# Patient Record
Sex: MALE
[De-identification: names, ages, dates, MRNs, and addresses within clinical notes are randomized per-mention and may not be internally consistent; named-entity substitution may affect disease eponyms.]

---

## 2019-07-11 PROBLEM — Z00.00 ENCOUNTER FOR PREVENTIVE HEALTH EXAMINATION: Status: ACTIVE | Noted: 2019-07-11

## 2019-08-02 ENCOUNTER — APPOINTMENT (OUTPATIENT)
Dept: OTOLARYNGOLOGY | Facility: CLINIC | Age: 69
End: 2019-08-02
Payer: MEDICARE

## 2019-08-02 VITALS
DIASTOLIC BLOOD PRESSURE: 80 MMHG | BODY MASS INDEX: 22.82 KG/M2 | WEIGHT: 142 LBS | HEIGHT: 66 IN | SYSTOLIC BLOOD PRESSURE: 134 MMHG | HEART RATE: 72 BPM

## 2019-08-02 PROCEDURE — 31254 NSL/SINS NDSC W/PRTL ETHMDCT: CPT

## 2019-08-02 PROCEDURE — 61782 SCAN PROC CRANIAL EXTRA: CPT

## 2019-08-02 PROCEDURE — 31267 ENDOSCOPY MAXILLARY SINUS: CPT

## 2019-08-02 NOTE — REVIEW OF SYSTEMS
[Seasonal Allergies] : seasonal allergies [Nasal Congestion] : nasal congestion [Throat Clearing] : throat clearing [Negative] : Heme/Lymph

## 2019-08-02 NOTE — PROCEDURE
The patient was treated for what was presumed to be a cellulitis following an IV. This was during her recent surgery. The area of the IV insertion was in the forearm were as the area of redness was more about the elbow but in any case it's almost entirely gone now. She does have what feels to be a thrombosed are sclerosed vein proximal to where the IV had gone.    In any case it looks fine      She does tell me that her 30-year-old daughter living in [place] was just diagnosed with colon cancer and family members were all advised to get colonoscopies. The patient doesn't think she's ever had one but has had negative stool Hemoccults at this point. I have placed a consult which she associates      15 minutes 95% consultation 10% exam   [FreeTextEntry3] : Preoperative diagnosis: Pansinusitis and pansinus polyposis\par Postoperative diagnosis: Same\par Procedure: Left endoscopic ethmoidectomy and maxillary sinusotomy with removal of nasal polyps\par Surgeon: Haim\par Anesthesia: Topical plus local\par \par Findings the patient has the history of the pansinusitis and nasal polyposis-recurrence. He is particularly bothered by the left-sided nasal obstruction and comes in for unilateral but more limited procedure. All risks and benefits were discussed with the patient as well as alternatives who understands and agrees.\par \par The nasal cavity was anesthetized topically with Afrin and tetracaine and then 2 cc of cocaine 4% were used intranasally on pledgets.\par Next, 3 cc of 1% lidocaine with epinephrine one 100,000 were used for injection.\par \par The [employer] headset was placed and the tertiary points marked.\par \par Reevaluation showed that there was this large obstructing polypoid disease completely filling the left nasal cavity coming from the left ethmoids.\par \par using image guidance\par

## 2019-08-02 NOTE — HISTORY OF PRESENT ILLNESS
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 He has the history of recurrent polypoid sinusitis. He has the history of the particularly bothersome and obstructing left sided polypoid disease and comes in for endoscopic surgery in the office. All risks and benefits and alternatives were discussed with the patient who understood and agreed.\par The option of a more complete endoscopic sinus surgery was discussed but she prefers a more limited procedure.\par The bothersome disease completely obstruct the airway anteriorly and comes from the left ethmoid labyrinth. This would be done with image guidance.

## 2019-08-06 ENCOUNTER — APPOINTMENT (OUTPATIENT)
Dept: OTOLARYNGOLOGY | Facility: CLINIC | Age: 69
End: 2019-08-06
Payer: MEDICARE

## 2019-08-06 VITALS
WEIGHT: 142 LBS | DIASTOLIC BLOOD PRESSURE: 79 MMHG | HEIGHT: 66 IN | HEART RATE: 81 BPM | SYSTOLIC BLOOD PRESSURE: 134 MMHG | BODY MASS INDEX: 22.82 KG/M2

## 2019-08-06 PROCEDURE — 31237 NSL/SINS NDSC SURG BX POLYPC: CPT | Mod: 50,58

## 2019-08-06 NOTE — ASSESSMENT
[FreeTextEntry1] : It was my impression that he was doing quite well 3 days post endoscopic surgery for left-sided maxillary and ethmoid polyposis.\par I placed him on budesonide rinses and would like to see him back for repeat debridement next week

## 2019-08-06 NOTE — PHYSICAL EXAM
[FreeTextEntry1] : The patient returns for postoperative sinus debridement CPT 10300-05.\par \par Procedure note:\par \par The nasal cavity was anesthetized topically and locally.  Both rigid 0 and 30° endoscopes were used for debridement.  There was no evidence of hematoma.  Using straight and curved suctions and straight and 30° up-biting forceps, the nasal cavity and sinuses were debrided anteriorly.  The patient appeared to be having an excellent result.  Propel was removed from the left. There were no complications. \par

## 2019-08-06 NOTE — HISTORY OF PRESENT ILLNESS
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 He is  3 days and status post left-sided endoscopic sinus surgery for polypoid sinusitis. His surgery was uneventful and she comes in for first postoperative debridement.

## 2019-08-13 ENCOUNTER — APPOINTMENT (OUTPATIENT)
Dept: OTOLARYNGOLOGY | Facility: CLINIC | Age: 69
End: 2019-08-13
Payer: MEDICARE

## 2019-08-13 PROCEDURE — 31231 NASAL ENDOSCOPY DX: CPT

## 2019-08-13 NOTE — ASSESSMENT
[FreeTextEntry1] : It was my impression that he was doing quite well 2 weeks postoperatively.\par There was no evidence of persistent disease in the sinuses were healing well.\par I recommended using the budesonide rinses and would like to see him back in one more time in about 3 weeks

## 2019-08-13 NOTE — HISTORY OF PRESENT ILLNESS
[de-identified] : MATT ONTIVEROS Was seen in followup for second postoperative debridements. He has been doing quite well and no significant improvement in his airway. He had not yet started rinses

## 2019-08-13 NOTE — PHYSICAL EXAM
[FreeTextEntry1] : The patient is status post endoscopic sinus surgery and returns for middle meatal debridement.\par \par Procedure note:  02251-93\par \par The nasal cavity was anesthetized topically and locally.  Using a rigid 0° and a rigid 30° endoscope, using both straight and curved suction iand a straight and the 30° up-biting forceps, the left middle meatus was cleaned.\par He was having an excellent result without polyps or masses.

## 2019-09-03 ENCOUNTER — APPOINTMENT (OUTPATIENT)
Dept: OTOLARYNGOLOGY | Facility: CLINIC | Age: 69
End: 2019-09-03
Payer: MEDICARE

## 2019-09-03 VITALS
HEIGHT: 66 IN | WEIGHT: 142 LBS | SYSTOLIC BLOOD PRESSURE: 148 MMHG | DIASTOLIC BLOOD PRESSURE: 95 MMHG | HEART RATE: 80 BPM | BODY MASS INDEX: 22.82 KG/M2

## 2019-09-03 PROCEDURE — 31231 NASAL ENDOSCOPY DX: CPT

## 2019-09-03 PROCEDURE — 99213 OFFICE O/P EST LOW 20 MIN: CPT | Mod: 25

## 2019-09-03 NOTE — PHYSICAL EXAM
[FreeTextEntry1] : General:\par The patient was alert and oriented and in no distress.\par He had a slightly breathy dysphonia\par \par Oral cavity:\par The oral mucosa was normal.\par The oral and base of tongue were clear and without mass.\par The gingival and buccal mucosa were moist and without lesions.\par The palate moved well.\par There was no cleft to the palate.\par There appeared to be good salivary flow.  \par There was no pus, erythema or mass in the oral cavity.\par \par Neck: \par The neck was symmetrical.\par The parotid and submandibular glands were normal without masses.\par The trachea was midline and there was no unusual crepitus.\par The thyroid was smooth and nontender and no masses were palpated.\par There was no significant cervical adenopathy.\par \par Face:\par The patient had no facial asymmetry or mass.\par The skin was unremarkable.\par \par Ears:\par The external ears were normal without deformity.\par The ear canals were clear.\par The tympanic membranes were intact and normal.\par \par TMJ:\par The temporomandibular joints were nontender.\par There was no abnormal crepitus and no significant malocclusion\par \par \par  [de-identified] : Nasal endoscopy: \par CPT 77490\par Procedure Note:\par \par Nasal endoscopy was done with topical anesthesia of Pontocaine and Afrin and a      nasal endoscope.\par Indication: Nasal congestion, rule out sinusitis.\par Procedure: The nasal cavity was anesthetized with topical Afrin and Pontocaine. An  endoscope was used and inserted into the nasal cavity.\par Attention was first paid to the anterior nasal cavity.\par Endocoscopy was performed to inspect the interior of the nasal cavity, the nasal septum,  the middle and superior meati, the inferior, middle and superior turbinates, and the spheno-ethmoidal  recesses, the nasopharynx and eustachian tube orifices bilaterally. \par All findings were normal except:\par Nasal mucosa was boggy.\par The left side which had surgery was completely opened and widely patent with no evidence of disease.\par There was a fair amount of yellow purulent discharge coming out of the right antrum. Switching to a rigid 0° endoscope this was cultured and then suctioned to clear\par \par Flexible fiberoptic laryngoscopy: CPT 92704\par Indications: Dysphagia\par Procedure note:\par  \par Flexible fiberoptic laryngoscopy was performed because of dysphagia and the patient's inability to tolerate adequate mirror examination.\par The nasal cavity was anesthetized with Pontocaine plus Afrin.\par \par \par The nasal cavity was anesthetized with Pontocaine and Afrin.\par The flexible endoscope was placed into the patient's nasal cavity.\par The nasopharynx was without masses.\par The oropharynx, vallecula and base of tongue had no masses.\par The epiglottis, aryepiglottic folds and false vocal cords were normal.\par The pyriform sinuses were without mucosal lesions or pooling of secretions.  \par The laryngeal ventricles were without lesions.\par The visualized subglottis was without mass.\par The lateral and posterior pharyngeal walls were clear and symmetrical.\par The vocal folds were clear and mobile; they abducted and adducted normally.\par There was no interarytenoid mass or fullness.\par There was significant posterior laryngeal inflammation consistent with reflux.\par There was a small area of granulation tissue on the right arytenoid\par There was no evidence of thrush

## 2019-09-03 NOTE — ASSESSMENT
[FreeTextEntry1] : It was my impression that he had done well from his sinus surgery but he had an upper respiratory tract infection with a secondary opposite side purulent sinusitis now in spite of a course of oral steroids.\par It started him on cefuroxime on Saturday over the phone.\par  Pending the culture results, which may be non-elucidting I suggested continuing with the course of cefuroxime. I felt he probably could taper the steroids but suggested adding Zantac at bedtime because of the granulations and inflammation and want to see him back in followup in 2-3 weeks to make sure that this has responded,

## 2019-09-03 NOTE — HISTORY OF PRESENT ILLNESS
[de-identified] : MATT ONTIVEROS Was seen in followup on September 3. This was originally in followup for his partial ethmoidectomy from which he has done quite well. However, about 2 weeks ago he developed nasal congestion sore throat cough and hoarseness. He started prednisone but over the weekend developed otalgia and was started on cefuroxime.\par His ear feels better and today his cough is somewhat improved. He still is dysphonic. He has not had problems in terms of his sinuses or the surgery and feels that his breathing is better on the left mouth on the right. He stopped mometasone   rinses.The patient had no other ear nose or throat complaints at this visit.

## 2019-09-09 LAB — BACTERIA FLD CULT: NORMAL

## 2019-09-10 ENCOUNTER — APPOINTMENT (OUTPATIENT)
Dept: OTOLARYNGOLOGY | Facility: CLINIC | Age: 69
End: 2019-09-10

## 2019-09-17 ENCOUNTER — APPOINTMENT (OUTPATIENT)
Dept: OTOLARYNGOLOGY | Facility: CLINIC | Age: 69
End: 2019-09-17
Payer: MEDICARE

## 2019-09-17 DIAGNOSIS — J32.2 CHRONIC ETHMOIDAL SINUSITIS: ICD-10-CM

## 2019-09-17 DIAGNOSIS — J32.0 CHRONIC MAXILLARY SINUSITIS: ICD-10-CM

## 2019-09-17 PROCEDURE — 99213 OFFICE O/P EST LOW 20 MIN: CPT | Mod: 25

## 2019-09-17 PROCEDURE — 31231 NASAL ENDOSCOPY DX: CPT

## 2019-09-17 NOTE — PHYSICAL EXAM
[FreeTextEntry1] : \par The patient was alert and oriented and in no distress.\par Voice was clear.\par \par Face:\par The patient had no facial asymmetry or mass.\par The skin was unremarkable.\par \par Eyes:\par The pupils were equal round and reactive to light and accommodation.\par There was no significant nystagmus or disconjugate gaze noted.\par \par Nose: \par The external nose had no significant deformity.  There was no facial tenderness.  On anterior rhinoscopy, the nasal mucosa was clear.  The anterior septum was midline.  There were no visualized polyps purulence  or masses.\par \par Oral cavity:\par The oral mucosa was normal.\par The oral and base of tongue were clear and without mass.\par The gingival and buccal mucosa were moist and without lesions.\par The palate moved well.\par There was no cleft to the palate.\par There appeared to be good salivary flow.  \par There was no pus, erythema or mass in the oral cavity.\par \par \par Ears:\par The external ears were normal without deformity.\par The ear canals were clear.\par The tympanic membranes were intact and normal.\par \par Neck: \par The neck was symmetrical.\par The parotid and submandibular glands were normal without masses.\par The trachea was midline and there was no unusual crepitus.\par The thyroid was smooth and nontender and no masses were palpated.\par There was no significant cervical adenopathy.\par \par The area of discomfort is the posterior cornu of the thyroid cartilage on the left.\par \par \par Neuro:\par Neurologically, the patient was awake, alert, and oriented to person, place and time. There were no obvious focal neurologic abnormalities.  Cranial nerves II through XII were grossly intact.\par \par \par TMJ:\par The temporomandibular joints were nontender.\par There was no abnormal crepitus and no significant malocclusion\par  [de-identified] : Nasal endoscopy: \par CPT 60308\par Procedure Note:\par \par Nasal endoscopy was done with topical anesthesia of Pontocaine and Afrin and a      nasal endoscope.\par Indication: Nasal congestion, rule out sinusitis.\par Procedure: The nasal cavity was anesthetized with topical Afrin and Pontocaine. An  endoscope was used and inserted into the nasal cavity.\par Attention was first paid to the anterior nasal cavity.\par Endocoscopy was performed to inspect the interior of the nasal cavity, the nasal septum,  the middle and superior meati, the inferior, middle and superior turbinates, and the spheno-ethmoidal  recesses, the nasopharynx and eustachian tube orifices bilaterally. \par All findings were normal except:\par \par On the left side, the mucosa looked excellent the ethmoids and antrostomies had healed well as no evidence of disease.\par He had a small amount of stable polypoid changes on the right with a scant amount of thick allergic appearing mucin without purulence. Nasopharynx is benign\par \par \par Flexible fiberoptic laryngoscopy: CPT 59209\par Indications: Dysphagia\par Procedure note:\par  \par Flexible fiberoptic laryngoscopy was performed because of dysphagia and the patient's inability to tolerate adequate mirror examination.\par The nasal cavity was anesthetized with Pontocaine plus Afrin.\par \par \par The nasal cavity was anesthetized with Pontocaine and Afrin.\par The flexible endoscope was placed into the patient's nasal cavity.\par The nasopharynx was without masses.\par The oropharynx, vallecula and base of tongue had no masses.\par The epiglottis, aryepiglottic folds and false vocal cords were normal.\par The pyriform sinuses were without mucosal lesions or pooling of secretions.  \par The laryngeal ventricles were without lesions.\par The visualized subglottis was without mass.\par The lateral and posterior pharyngeal walls were clear and symmetrical.\par The vocal folds were clear and mobile; they abducted and adducted normally.\par There was no interarytenoid mass or fullness.\par There was significant posterior laryngeal inflammation consistent with reflux.

## 2019-09-17 NOTE — HISTORY OF PRESENT ILLNESS
[de-identified] : MATT ONTIVEROS Was seen in followup on September 17.  He notes that his voice is done better but he had several days of right-sided neck pain. She went to an emergency room where they suggested CT imaging, which she did not have done in the neck discomfort resolved and now he has discomfort on the left. He denies any fever or airway obstruction. His sinuses feel better.The patient had no other ear nose or throat complaints at this visit.

## 2019-10-15 ENCOUNTER — APPOINTMENT (OUTPATIENT)
Dept: OTOLARYNGOLOGY | Facility: CLINIC | Age: 69
End: 2019-10-15
Payer: MEDICARE

## 2019-10-15 DIAGNOSIS — J32.9 CHRONIC SINUSITIS, UNSPECIFIED: ICD-10-CM

## 2019-10-15 DIAGNOSIS — J33.9 CHRONIC SINUSITIS, UNSPECIFIED: ICD-10-CM

## 2019-10-15 PROCEDURE — 99213 OFFICE O/P EST LOW 20 MIN: CPT | Mod: 25

## 2019-10-15 PROCEDURE — 31231 NASAL ENDOSCOPY DX: CPT

## 2019-10-15 NOTE — HISTORY OF PRESENT ILLNESS
[de-identified] : MATT ONTIVEROS Was seen in follow up on October 15. He was doing much better. His throat pain was better and his voice was significantly improved. His nasal airway was better as well. He was using omeprazole in the morning and had switched to Pepcid at night and using fluticasone.\par He comes in for repeat evaluation

## 2019-10-15 NOTE — REASON FOR VISIT
[Subsequent Evaluation] : a subsequent evaluation for [Sinusitis] : sinusitis [Throat Pain] : throat pain

## 2019-10-15 NOTE — PHYSICAL EXAM
[FreeTextEntry1] : General:\par The patient was alert and oriented and in no distress.\par Voice was clear. His voice was improved\par \par Ears:\par The external ears were normal without deformity.\par The ear canals were clear.\par The tympanic membranes were intact and normal.\par \par Oral cavity:\par The oral mucosa was normal.\par The oral and base of tongue were clear and without mass.\par The gingival and buccal mucosa were moist and without lesions.\par The palate moved well.\par There was no cleft to the palate.\par There appeared to be good salivary flow.  \par There was no pus, erythema or mass in the oral cavity.\par \par Neck: \par The neck was symmetrical.\par The parotid and submandibular glands were normal without masses.\par The trachea was midline and there was no unusual crepitus.\par The thyroid was smooth and nontender and no masses were palpated.\par There was no significant cervical adenopathy.\par \par Face:\par The patient had no facial asymmetry or mass.\par The skin was unremarkable.\par \par Neuro:\par Neurologically, the patient was awake, alert, and oriented to person, place and time. There were no obvious focal neurologic abnormalities.  Cranial nerves II through XII were grossly intact.\par \par Nasal endoscopy: \par CPT 34113\par Procedure Note:\par \par Nasal endoscopy was done with topical anesthesia of Pontocaine and Afrin and a      nasal endoscope.\par Indication: Nasal congestion, rule out sinusitis.\par Procedure: The nasal cavity was anesthetized with topical Afrin and Pontocaine. An  endoscope was used and inserted into the nasal cavity.\par Attention was first paid to the anterior nasal cavity.\par Endocoscopy was performed to inspect the interior of the nasal cavity, the nasal septum,  the middle and superior meati, the inferior, middle and superior turbinates, and the spheno-ethmoidal  recesses, the nasopharynx and eustachian tube orifices bilaterally. \par All findings were normal except:\par This showed a nasal mucosa was slightly boggy but much improved. He has a surgically on the left middle meatus was clear and without persistent polypoid disease. He had a right septal deflection and the previous polypoid changes had improved significantly.\par \par Flexible fiberoptic laryngoscopy: [facility] [address]\par Indications: Dysphagia\par Procedure note:\par \par Flexible fiberoptic laryngoscopy was performed because of dysphagia and because of the patient's inability to tolerate adequate mirror examination.\par \par The nasal cavity was anesthetized with Pontocaine and Afrin.\par The flexible endoscope was placed into the patient's nasal cavity.\par The nasopharynx was without masses.\par The oropharynx, vallecula and base of tongue had no masses.\par The epiglottis, aryepiglottic folds and false vocal cords were normal.\par The pyriform sinuses were without mucosal lesions or pooling of secretions.  \par The laryngeal ventricles were without lesions.\par The visualized subglottis was without mass.\par The lateral and posterior pharyngeal walls were clear and symmetrical.\par The vocal folds were clear and mobile; they abducted and adducted normally.\par There was no interarytenoid mass or fullness.\par The endolaryngeal inflammation was much better\par \par \par

## 2019-10-15 NOTE — ASSESSMENT
[FreeTextEntry1] : It was my impression that he had done quite well from the surgery And was doing much better. His throat was better his neck pain was better his voice was better.  at this point he was interested in tapering his medications. I suggested stopping the nighttime Pepcid and just continuing with omeprazole and diet. I explained he probably should continue with the fluticasone but he may want to stop this as well.\par If he continues to do well after a month of the omeprazole alone, he will see if he can stop that as well.\par Otherwise I would like to reevaluate for recurrence of his polyps in 3 months or earlier if needed\par

## 2020-03-24 ENCOUNTER — APPOINTMENT (OUTPATIENT)
Dept: ORTHOPEDIC SURGERY | Facility: CLINIC | Age: 70
End: 2020-03-24

## 2020-10-22 ENCOUNTER — APPOINTMENT (OUTPATIENT)
Dept: OTOLARYNGOLOGY | Facility: CLINIC | Age: 70
End: 2020-10-22
Payer: MEDICARE

## 2020-10-22 VITALS — HEIGHT: 66 IN | WEIGHT: 142 LBS | TEMPERATURE: 98.7 F | BODY MASS INDEX: 22.82 KG/M2

## 2020-10-22 DIAGNOSIS — H61.23 IMPACTED CERUMEN, BILATERAL: ICD-10-CM

## 2020-10-22 PROCEDURE — 69210 REMOVE IMPACTED EAR WAX UNI: CPT

## 2020-10-22 PROCEDURE — 99213 OFFICE O/P EST LOW 20 MIN: CPT | Mod: 25

## 2020-10-22 PROCEDURE — 41105 BIOPSY OF TONGUE: CPT

## 2020-10-22 PROCEDURE — 40808 BIOPSY OF MOUTH LESION: CPT

## 2020-10-22 RX ORDER — SULFAMETHOXAZOLE AND TRIMETHOPRIM 400; 80 MG/1; MG/1
400-80 TABLET ORAL TWICE DAILY
Qty: 14 | Refills: 0 | Status: DISCONTINUED | COMMUNITY
Start: 2019-08-02 | End: 2020-10-22

## 2020-10-22 RX ORDER — BUDESONIDE 0.5 MG/2ML
0.5 INHALANT ORAL TWICE DAILY
Qty: 60 | Refills: 5 | Status: DISCONTINUED | COMMUNITY
Start: 2019-08-06 | End: 2020-10-22

## 2020-10-22 RX ORDER — PREDNISONE 20 MG/1
20 TABLET ORAL DAILY
Qty: 8 | Refills: 0 | Status: DISCONTINUED | COMMUNITY
Start: 2019-08-02 | End: 2020-10-22

## 2020-10-22 RX ORDER — FLUTICASONE PROPIONATE 50 UG/1
50 SPRAY, METERED NASAL DAILY
Qty: 1 | Refills: 5 | Status: DISCONTINUED | COMMUNITY
Start: 2019-09-17 | End: 2020-10-22

## 2020-10-22 RX ORDER — OMEPRAZOLE 40 MG/1
40 CAPSULE, DELAYED RELEASE ORAL
Qty: 30 | Refills: 3 | Status: DISCONTINUED | COMMUNITY
Start: 2019-09-17 | End: 2020-10-22

## 2020-10-22 RX ORDER — FAMOTIDINE 40 MG/1
40 TABLET, FILM COATED ORAL
Qty: 30 | Refills: 5 | Status: DISCONTINUED | COMMUNITY
Start: 2019-09-17 | End: 2020-10-22

## 2020-10-22 RX ORDER — RANITIDINE 300 MG/1
300 TABLET ORAL
Qty: 30 | Refills: 5 | Status: DISCONTINUED | COMMUNITY
Start: 2019-09-03 | End: 2020-10-22

## 2020-10-22 RX ORDER — ACETAMINOPHEN AND CODEINE 300; 30 MG/1; MG/1
300-30 TABLET ORAL 4 TIMES DAILY
Qty: 12 | Refills: 0 | Status: DISCONTINUED | COMMUNITY
Start: 2019-08-02 | End: 2020-10-22

## 2020-10-27 NOTE — HISTORY OF PRESENT ILLNESS
[de-identified] : MATT ONTIVEROS is a 70 year man with a history of oral lesion noted first in August. it was flat and minimally tender. it resolved and then recurred in September.Yesterday he noted a blood filled blister minimally painful.

## 2020-11-19 ENCOUNTER — APPOINTMENT (OUTPATIENT)
Dept: OTOLARYNGOLOGY | Facility: CLINIC | Age: 70
End: 2020-11-19
Payer: MEDICARE

## 2020-11-19 VITALS — WEIGHT: 142 LBS | HEIGHT: 66 IN | TEMPERATURE: 98.7 F | BODY MASS INDEX: 22.82 KG/M2

## 2020-11-19 DIAGNOSIS — K13.70 UNSPECIFIED LESIONS OF ORAL MUCOSA: ICD-10-CM

## 2020-11-19 PROCEDURE — 99212 OFFICE O/P EST SF 10 MIN: CPT

## 2020-11-19 RX ORDER — TRIAMCINOLONE ACETONIDE 1 MG/G
0.1 PASTE DENTAL TWICE DAILY
Qty: 5 | Refills: 1 | Status: ACTIVE | COMMUNITY
Start: 2020-11-19 | End: 1900-01-01

## 2020-11-19 NOTE — HISTORY OF PRESENT ILLNESS
[de-identified] : MATT ONTIVEROS is a 70 year man with a history of right oral lesion. Brush biopsy was negative. He is asymptomatic.

## 2020-12-10 ENCOUNTER — APPOINTMENT (OUTPATIENT)
Dept: OTOLARYNGOLOGY | Facility: CLINIC | Age: 70
End: 2020-12-10

## 2022-01-11 ENCOUNTER — APPOINTMENT (OUTPATIENT)
Dept: OTOLARYNGOLOGY | Facility: CLINIC | Age: 72
End: 2022-01-11
Payer: MEDICARE

## 2022-01-11 DIAGNOSIS — J32.9 CHRONIC SINUSITIS, UNSPECIFIED: ICD-10-CM

## 2022-01-11 DIAGNOSIS — K21.9 GASTRO-ESOPHAGEAL REFLUX DISEASE W/OUT ESOPHAGITIS: ICD-10-CM

## 2022-01-11 PROCEDURE — 99214 OFFICE O/P EST MOD 30 MIN: CPT | Mod: 25

## 2022-01-11 PROCEDURE — 31231 NASAL ENDOSCOPY DX: CPT

## 2022-01-11 RX ORDER — FLUTICASONE PROPIONATE 50 UG/1
50 SPRAY, METERED NASAL
Qty: 1 | Refills: 3 | Status: ACTIVE | COMMUNITY
Start: 2022-01-11 | End: 1900-01-01

## 2022-01-11 RX ORDER — FAMOTIDINE 40 MG/1
40 TABLET, FILM COATED ORAL
Qty: 30 | Refills: 2 | Status: ACTIVE | COMMUNITY
Start: 2022-01-11 | End: 1900-01-01

## 2022-01-11 NOTE — PHYSICAL EXAM
[FreeTextEntry1] : General:\par The patient was alert and oriented and in no distress.\par Voice was clear.  He had a slightly mucousy voice\par \par Ears:\par The external ears were normal without deformity.\par The ear canals were clear.\par The tympanic membranes were intact and normal.\par \par Oral cavity:\par The oral mucosa was normal.\par The oral and base of tongue were clear and without mass.\par The gingival and buccal mucosa were moist and without lesions.\par The palate moved well.\par There was no cleft to the palate.\par There appeared to be good salivary flow.  \par There was no pus, erythema or mass in the oral cavity.\par \par Neck: \par The neck was symmetrical.\par The parotid and submandibular glands were normal without masses.\par The trachea was midline and there was no unusual crepitus.\par The thyroid was smooth and nontender and no masses were palpated.\par There was no significant cervical adenopathy.\par \par Face:\par 1, he has a hard firm nonmobile 1 cm mass of the left skull\par 2.  He has less facial soft tissue on the left when compared to the right but normal facial mobility\par \par \par Neuro:\par Neurologically, the patient was awake, alert, and oriented to person, place and time. There were no obvious focal neurologic abnormalities.  Cranial nerves II through XII were grossly intact.\par \par Nose:\par He has a slight epidermal irregularity to the right side of the nasal dorsum\par \par Nasal endoscopy: \par CPT 97859\par Procedure Note:\par \par Endoscopy was done with Covid precautions and with video. All risks and benefits were discussed with the patient and consent obtained.\par \par Nasal endoscopy was done with topical anesthesia of Pontocaine and Afrin and a      nasal endoscope.\par Indication: Nasal congestion, rule out sinusitis.\par Procedure: The nasal cavity was anesthetized with topical Afrin and Pontocaine. An  endoscope was used and inserted into the nasal cavity.\par Attention was first paid to the anterior nasal cavity.\par Endocoscopy was performed to inspect the interior of the nasal cavity, the nasal septum,  the middle and superior meati, the inferior, middle and superior turbinates, and the spheno-ethmoidal  recesses, the nasopharynx and eustachian tube orifices bilaterally. \par All findings were normal except:\par \par The mucosa is quite boggy with a left septal deflection but there is no significant polypoid recurrence on the left.  He had multiple small polyps on the right in the area that had not been operated on\par \par Flexible fiberoptic laryngoscopy: CPT 30446\par Indications: Dysphagia\par Procedure note:\par  \par Flexible fiberoptic laryngoscopy was performed because of dysphagia and the patient's inability to tolerate adequate mirror examination.\par The nasal cavity was anesthetized with Pontocaine plus Afrin.\par \par \par The nasal cavity was anesthetized with Pontocaine and Afrin.\par The flexible endoscope was placed into the nasal cavity.\par The nasopharynx was without masses.\par The oropharynx, vallecula and base of tongue had no masses.\par The epiglottis, aryepiglottic folds and false vocal cords were normal.\par The pyriform sinuses were without mucosal lesions or pooling of secretions.  \par The laryngeal ventricles were without lesions.\par The visualized subglottis was without mass.\par The lateral and posterior pharyngeal walls were clear and symmetrical.\par The vocal folds were clear and mobile; they abducted and adducted normally.\par There was no interarytenoid mass or fullness.\par There was significant posterior laryngeal inflammation consistent with reflux.\par \par Endoscopy was done with Covid precautions and with video. All risks and benefits were discussed with the patient and consent obtained.\par He has a small granuloma of the right arytenoid and some inflammation of the left arytenoid.  So GI endoscopy

## 2022-01-11 NOTE — ASSESSMENT
[FreeTextEntry1] : I called up the CT scans that he had from 2019\par \par I reviewed these with him at length.\par This obviously shows a significant septal deflection.\par \par It shows a 1 cm skull raised bony lesion that seems the same size is now.  This is not pathogenic and I would just follow \par He has less subcutaneous and fascial fat on the left when compared to the right on these imagings.   I did not have an etiology, but this does not seem new as this was in his films from then and in an old photo. He has no history of antiviral therapy.\par \par I reviewed this with him and suggested fillers if desired.\par \par I asked him to see his dermatologist about his skin lesion\par \par The right-sided nasal congestion is from right-sided nasal polyps and I did not know of a relationshiop to his trauma.  I recommended Flonase as a nasal steroid.  I did not see evidence of nasal trauma\par \par It was my impression is that the patient's symptoms were from laryngeal evidence of reflux.  I reviewed an anti-reflux diet at length with the patient.  I recommended a course of famotidine 40 mg at bedtime.  I suggested a repeat evaluation in 4-6 weeks to make sure that the patient is improving.  If not I would recommend further evaluation including possibly pH testing, PPI therapy and/or further GI evaluation.\par He has a small granuloma on the right, probably from a combination of reflux and intubation. \par \par

## 2022-01-11 NOTE — CONSULT LETTER
[FreeTextEntry2] : MATT ONTIVEROS\par  [FreeTextEntry1] : \par \par Dear  Dr. MATT ONTIVEROS,\par \par I had the pleasure of seeing your patient today.  \par Please see my note below.\par \par \par Thank you very much for allowing me to participate in the care of your patient.\par \par Sincerely,\par \par \par Ronaldo Whitmore MD\par [facility]\par [facility]\par  [facility]\par \par

## 2022-01-11 NOTE — HISTORY OF PRESENT ILLNESS
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  He comes in with quite a few issues as far as the head and neck.   The first is that he has a firm lesion over the left side of these skull.  This is otherwise asymptomatic and he is unsure whether it has been there.  He has an irregular skin lesion of the nasal dorsum.  He notes that he has a facial asymmetry with the left being less prominent than the right but brings in photos showing that this has been "the case for several years at least.  He has not been on antivirals.  \par He was having a GI endoscopy few months ago and apparently they lost his airway.  They first tried a right sided nasal trumpet and then later intubated him.  Since then, he has a recurrent foreign body sensation on the right side of his throat.  This lasts a few minutes several times a day and causes him to have a nonproductive cough.\par He has a history of nasal polyps and and allergies and had polypectomy here 2-1/2 years ago.\par He notes that his right sided airway is worse than his left and is concerned that there was trauma with the attempted nasal trumpet.\par \par The patient had no other ear nose or throat complaints at this visit.

## 2025-02-11 ENCOUNTER — APPOINTMENT (OUTPATIENT)
Dept: OTOLARYNGOLOGY | Facility: CLINIC | Age: 75
End: 2025-02-11
Payer: MEDICARE

## 2025-02-11 ENCOUNTER — NON-APPOINTMENT (OUTPATIENT)
Age: 75
End: 2025-02-11

## 2025-02-11 DIAGNOSIS — J34.2 DEVIATED NASAL SEPTUM: ICD-10-CM

## 2025-02-11 DIAGNOSIS — H61.20 IMPACTED CERUMEN, UNSPECIFIED EAR: ICD-10-CM

## 2025-02-11 PROCEDURE — 31231 NASAL ENDOSCOPY DX: CPT

## 2025-02-11 PROCEDURE — 69210 REMOVE IMPACTED EAR WAX UNI: CPT

## 2025-02-11 PROCEDURE — 99203 OFFICE O/P NEW LOW 30 MIN: CPT | Mod: 25
